# Patient Record
(demographics unavailable — no encounter records)

---

## 2024-11-15 NOTE — PHYSICAL EXAM
[FreeTextEntry1] : General: healthy appearing, acting appropriate for age.  HEENT: NCAT, Normal conjunctiva Cardio: Appears well perfused, no peripheral edema, brisk cap refill.  Lungs: no obvious increased WOB, no audible wheeze heard without use of stethoscope.  Abdomen: not examined.  Skin: No visible rashes on exposed skin  R ankle:  +swelling and ttp over the medial mal ROM limited NVI, SILT, moving toes freely WWP distally, brisk cap refill

## 2024-11-15 NOTE — REASON FOR VISIT
[Initial Evaluation] : an initial evaluation [Patient] : patient [Father] : father [FreeTextEntry1] : R ankle injury, sustained on 10/16/24 Grossly 3-/5 bilateral LEs/grossly assessed due to [Family Member] : family member

## 2024-11-15 NOTE — END OF VISIT
[FreeTextEntry3] : I, Elio Kuhn MD, I personally performed the services described in the documentation, reviewed the documentation recorded by the scribe in my presence and it accurately and completely records my words and actions

## 2024-11-15 NOTE — HISTORY OF PRESENT ILLNESS
[FreeTextEntry1] : Shu is a 15 yo F who presents with a R ankle injury, sustained on 10/16/24. Patient fell backward playing football, and had pain and swelling of the R ankle. She initially presented to [facility] ED, where she was told she had a fracture after XRs were performed, and she was placed into a splint. She reports wearing the splint for 2 weeks and then was seen by an outside orthopedist. She was transitioned to a CAM boot. She has been WBAT in the CAM boot, and weaned from the crutches. She was referred to our office for further evaluation of her fracture.

## 2024-11-15 NOTE — REVIEW OF SYSTEMS
[Change in Activity] : change in activity [Limping] : limping [Joint Pains] : arthralgias [NI] : Endocrine [Nl] : Hematologic/Lymphatic [Itching] : no itching

## 2024-11-15 NOTE — HISTORY OF PRESENT ILLNESS
[FreeTextEntry1] : Shu is a 15 yo F who presents with a R ankle injury, sustained on 10/16/24. Patient fell backward playing football, and had pain and swelling of the R ankle. She initially presented to [facility] ED, where she was told she had a fracture after XRs were performed, and she was placed into a splint. She reports wearing the splint for 2 weeks and then was seen by an outside orthopedist. She was transitioned to a CAM boot. She has been WBAT in the CAM boot, and weaned from the crutches. She was referred to our office for further evaluation of her fracture.

## 2024-11-15 NOTE — ASSESSMENT
[FreeTextEntry1] : Shu is a 15 yo F with a R medial malleolus fracture, sustained 4 weeks ago.   XRs obtained in our office today show R medial malleolus fracture the fracture is in acceptable Alignment but no interval healing and tit need to be compressed She is indicated for surgical intervention. We discussed operative versus nonoperative treatment, and family is in agreement with surgical intervention. She will undergo R ankle medial malleolus ORIF. We will coordinate procedure for next week. Until procedure she can remain with CAM boot, she should be NWB RLE, use crutches to assist with ambulation.   Patient can use tylenol/motrin as needed for pain.  We recommend avoiding gym/sports/physical activity. A school note was provided.  We recommended f/u in our office for post op evaluation, about 1 week after procedure for post op check and XR R ankle.    Today's visit included obtaining the history from the child and parent, due to the child's age, the child could not be considered a reliable historian, requiring the parent to act as an independent historian. The condition, natural history, and prognosis were explained to the patient and family. The clinical findings and images were reviewed with the family. All questions answered. Family expressed understanding and agreement with the above.  I, Becky Clark PA-C, acted as scribe and documented the above for Dr. Kuhn.

## 2024-11-15 NOTE — DATA REVIEWED
[de-identified] : XR R ankle 3 views obtained and independently reviewed in our office today 11/15/24 : Medial malleolus fracture.  alignment is acceptable but no interval healing

## 2024-11-15 NOTE — DATA REVIEWED
[de-identified] : XR R ankle 3 views obtained and independently reviewed in our office today 11/15/24 : Medial malleolus fracture.  alignment is acceptable but no interval healing

## 2024-11-15 NOTE — REASON FOR VISIT
[Initial Evaluation] : an initial evaluation [Patient] : patient [Father] : father [FreeTextEntry1] : R ankle injury, sustained on 10/16/24 [Family Member] : family member

## 2024-12-05 NOTE — POST OP
[de-identified] : Right ankle medial mal ORIF 11/20/24 [de-identified] : Patient is a 15 year old female s/p ORIF right ankle medial mal 11/20/24  R ankle injury, sustained on 10/16/24. Patient fell backward playing football, and had pain and swelling of the R ankle. She initially presented to [facility] ED, where she was told she had a fracture after XRs were performed, and she was placed into a splint. She reports wearing the splint for 2 weeks and then was seen by an outside orthopedist. She was transitioned to a CAM boot. She has been WBAT in the CAM boot, and weaned from the crutches. She was referred to our office for further evaluation of her fracture. She was indicated for surgery and underwent ORIF 11/20/24.    Patient is here today for first postoperative appointment.  [de-identified] : General: healthy appearing, acting appropriate for age. HEENT: NCAT, Normal conjunctiva Cardio: Appears well perfused, no peripheral edema, brisk cap refill. Lungs: no obvious increased WOB, no audible wheeze heard without use of stethoscope. Abdomen: not examined. Skin: No visible rashes on exposed skin  R ankle: +swelling  surgical site healing well, no sign of infection ROM limited NVI, SILT, moving toes freely WWP distally, brisk cap refill. [de-identified] : Right ankle 3 views 12/5/24  RIGHT ANKLE 3 VIEWS RADIOGRAPH  12/05/24 :Status post ORIF right medial mal Hardware in place, no change from postop flouro imaging  No fracture or dislocation [de-identified] : Patient is a 15 year old female status post right ankle medial mal ORIF 11/20/24 [de-identified] : WBAT  No immobilization of ankle needed  No gym or sports school note provided  PT 2-3x/wk for 6 weeks, working on ankle ROM and strength  FU in office in 6 weeks for repeat XR and ROM evaluation  Discussed that hardware could be removed in 3 months if patient elects so  Plan discussed with family who understands and agrees